# Patient Record
(demographics unavailable — no encounter records)

---

## 2024-12-25 NOTE — FAMILY HISTORY
[___ inches] : [unfilled] inches [FreeTextEntry5] : at 9 years of age, was early, stopped then returned 1 year later [FreeTextEntry4] : reportedly MGM 66",  MGF 72", PGM 64" and PGF 67"

## 2024-12-25 NOTE — PAST MEDICAL HISTORY
[At Term] : at term [ Section] : by  section [None] : there were no delivery complications [Age Appropriate] : age appropriate developmental milestones met [de-identified] : bakari, scheduled c/s [FreeTextEntry1] : 7 lb 14 oz

## 2024-12-25 NOTE — HISTORY OF PRESENT ILLNESS
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 Of note mother did not want to bring attention to it and had told her it was related to her HSP from last year. Without her per mother it was first noted right before Thanksgiving, just a few strands were noted and they seemed very soft. Recommended to come. She has not had any axillary hair development and no body odor. She has not been noted to have any breast development. They are not aware of any growth acceleration. They do not use any creams or oils for her, and deny anyone at home using any medications that she may have gotten into. She eats a normal diet. She has here and there stomach pain, but PCP felt it may be due to anxiety.  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 She was found first to have hematuria. Did not need to be admitted. She was on medication and better now.   Growth chart shows steady height and weight gain, BMI normal Mother is full time mother Father sells mortgages

## 2024-12-25 NOTE — HISTORY OF PRESENT ILLNESS
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 Of note mother did not want to bring attention to it and had told her it was related to her HSP from last year. Without her per mother it was first noted right before Thanksgiving, just a few strands were noted and they seemed very soft. Recommended to come. She has not had any axillary hair development and no body odor. She has not been noted to have any breast development. They are not aware of any growth acceleration. They do not use any creams or oils for her, and deny anyone at home using any medications that she may have gotten into. She eats a normal diet. She has here and there stomach pain, but PCP felt it may be due to anxiety.  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 She was found first to have hematuria. Did not need to be admitted. She was on medication and better now.   Growth chart shows steady height and weight gain, BMI normal Mother is full time mother Father sells mortgages

## 2024-12-25 NOTE — PAST MEDICAL HISTORY
[At Term] : at term [ Section] : by  section [None] : there were no delivery complications [Age Appropriate] : age appropriate developmental milestones met [de-identified] : bakari, scheduled c/s [FreeTextEntry1] : 7 lb 14 oz

## 2024-12-25 NOTE — CONSULT LETTER
[Dear  ___] : Dear  [unfilled], [Please see my note below.] : Please see my note below. [Consult Closing:] : Thank you very much for allowing me to participate in the care of this patient.  If you have any questions, please do not hesitate to contact me. [Sincerely,] : Sincerely, [FreeTextEntry3] : YeouChing Hsu, MD Division of Pediatric Endocrinology [facility]  of Pediatric[facility]

## 2024-12-25 NOTE — PHYSICAL EXAM
[Healthy Appearing] : healthy appearing [Well Nourished] : well nourished [Interactive] : interactive [Normal Appearance] : normal appearance [Well formed] : well formed [Normally Set] : normally set [Normal S1 and S2] : normal S1 and S2 [Clear to Ausculation Bilaterally] : clear to auscultation bilaterally [Abdomen Soft] : soft [Abdomen Tenderness] : non-tender [] : no hepatosplenomegaly [3] : was Darren stage 3 [Darren Stage ___] : the Darren stage for breast development was [unfilled] [Normal] : normal  [Murmur] : no murmurs [FreeTextEntry2] : No axillary hair, a few scant, slightly curly and slightly dark pubic hair

## 2024-12-25 NOTE — CONSULT LETTER
[Dear  ___] : Dear  [unfilled], [Please see my note below.] : Please see my note below. [Consult Closing:] : Thank you very much for allowing me to participate in the care of this patient.  If you have any questions, please do not hesitate to contact me. [Sincerely,] : Sincerely, [FreeTextEntry3] : YeouChing Hsu, MD Division of Pediatric Endocrinology [facility]  of Pediatrics [facility] at [facility]

## 2024-12-25 NOTE — HISTORY OF PRESENT ILLNESS
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 Of note mother did not want to bring attention to it and had told her it was related to her HSP from last year. Without her per mother it was first noted right before Thanksgiving, just a few strands were noted and they seemed very soft. Recommended to come. She has not had any axillary hair development and no body odor. She has not been noted to have any breast development. They are not aware of any growth acceleration. They do not use any creams or oils for her, and deny anyone at home using any medications that she may have gotten into. She eats a normal diet. She has here and there stomach pain, but PCP felt it may be due to anxiety.  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 She was found first to have hematuria. Did not need to be admitted. She was on medication and better now.   Growth chart shows steady height and weight gain, BMI normal Mother is full time mother Father sells mortgages

## 2024-12-25 NOTE — PAST MEDICAL HISTORY
[At Term] : at term [ Section] : by  section [None] : there were no delivery complications [Age Appropriate] : age appropriate developmental milestones met [de-identified] : bakari, scheduled c/s [FreeTextEntry1] : 7 lb 14 oz

## 2024-12-25 NOTE — CONSULT LETTER
[Dear  ___] : Dear  [unfilled], [Please see my note below.] : Please see my note below. [Consult Closing:] : Thank you very much for allowing me to participate in the care of this patient.  If you have any questions, please do not hesitate to contact me. [Sincerely,] : Sincerely, [FreeTextEntry3] : YeouChing Hsu, MD Division of Pediatric Endocrinology [facility]  of Pediatrics [facility] at [facility]